# Patient Record
Sex: MALE | NOT HISPANIC OR LATINO
[De-identification: names, ages, dates, MRNs, and addresses within clinical notes are randomized per-mention and may not be internally consistent; named-entity substitution may affect disease eponyms.]

---

## 2017-12-21 PROBLEM — Z00.00 ENCOUNTER FOR PREVENTIVE HEALTH EXAMINATION: Status: ACTIVE | Noted: 2017-12-21

## 2018-01-09 ENCOUNTER — APPOINTMENT (OUTPATIENT)
Dept: SURGERY | Facility: CLINIC | Age: 58
End: 2018-01-09
Payer: COMMERCIAL

## 2018-01-09 VITALS
DIASTOLIC BLOOD PRESSURE: 100 MMHG | BODY MASS INDEX: 42.85 KG/M2 | OXYGEN SATURATION: 98 % | WEIGHT: 273 LBS | HEART RATE: 78 BPM | HEIGHT: 67 IN | SYSTOLIC BLOOD PRESSURE: 135 MMHG

## 2018-01-09 DIAGNOSIS — Z87.442 PERSONAL HISTORY OF URINARY CALCULI: ICD-10-CM

## 2018-01-09 DIAGNOSIS — Z87.39 PERSONAL HISTORY OF OTHER DISEASES OF THE MUSCULOSKELETAL SYSTEM AND CONNECTIVE TISSUE: ICD-10-CM

## 2018-01-09 DIAGNOSIS — Z86.79 PERSONAL HISTORY OF OTHER DISEASES OF THE CIRCULATORY SYSTEM: ICD-10-CM

## 2018-01-09 DIAGNOSIS — F17.200 NICOTINE DEPENDENCE, UNSPECIFIED, UNCOMPLICATED: ICD-10-CM

## 2018-01-09 DIAGNOSIS — Z78.9 OTHER SPECIFIED HEALTH STATUS: ICD-10-CM

## 2018-01-09 PROCEDURE — 99242 OFF/OP CONSLTJ NEW/EST SF 20: CPT

## 2018-01-09 RX ORDER — ASPIRIN ENTERIC COATED TABLETS 81 MG 81 MG/1
81 TABLET, DELAYED RELEASE ORAL
Refills: 0 | Status: ACTIVE | COMMUNITY

## 2018-01-09 RX ORDER — LOSARTAN POTASSIUM AND HYDROCHLOROTHIAZIDE 12.5; 5 MG/1; MG/1
50-12.5 TABLET ORAL
Refills: 0 | Status: ACTIVE | COMMUNITY

## 2018-01-22 ENCOUNTER — OUTPATIENT (OUTPATIENT)
Dept: OUTPATIENT SERVICES | Facility: HOSPITAL | Age: 58
LOS: 1 days | Discharge: HOME | End: 2018-01-22

## 2018-01-22 DIAGNOSIS — Z01.818 ENCOUNTER FOR OTHER PREPROCEDURAL EXAMINATION: ICD-10-CM

## 2018-01-22 DIAGNOSIS — K42.9 UMBILICAL HERNIA WITHOUT OBSTRUCTION OR GANGRENE: ICD-10-CM

## 2018-02-01 ENCOUNTER — APPOINTMENT (OUTPATIENT)
Dept: SURGERY | Facility: CLINIC | Age: 58
End: 2018-02-01
Payer: COMMERCIAL

## 2018-02-01 VITALS — DIASTOLIC BLOOD PRESSURE: 99 MMHG | SYSTOLIC BLOOD PRESSURE: 138 MMHG

## 2018-02-01 DIAGNOSIS — L72.3 SEBACEOUS CYST: ICD-10-CM

## 2018-02-01 PROCEDURE — 99212 OFFICE O/P EST SF 10 MIN: CPT

## 2018-02-14 ENCOUNTER — OUTPATIENT (OUTPATIENT)
Dept: OUTPATIENT SERVICES | Facility: HOSPITAL | Age: 58
LOS: 1 days | Discharge: HOME | End: 2018-02-14

## 2018-02-14 VITALS
HEIGHT: 68 IN | SYSTOLIC BLOOD PRESSURE: 141 MMHG | TEMPERATURE: 98 F | HEART RATE: 75 BPM | WEIGHT: 272.93 LBS | OXYGEN SATURATION: 96 % | DIASTOLIC BLOOD PRESSURE: 92 MMHG | RESPIRATION RATE: 17 BRPM

## 2018-02-14 VITALS — SYSTOLIC BLOOD PRESSURE: 160 MMHG | HEART RATE: 73 BPM | DIASTOLIC BLOOD PRESSURE: 92 MMHG | RESPIRATION RATE: 17 BRPM

## 2018-02-14 DIAGNOSIS — Z98.890 OTHER SPECIFIED POSTPROCEDURAL STATES: Chronic | ICD-10-CM

## 2018-02-14 DIAGNOSIS — N20.0 CALCULUS OF KIDNEY: Chronic | ICD-10-CM

## 2018-02-14 RX ORDER — MORPHINE SULFATE 50 MG/1
4 CAPSULE, EXTENDED RELEASE ORAL ONCE
Qty: 0 | Refills: 0 | Status: DISCONTINUED | OUTPATIENT
Start: 2018-02-14 | End: 2018-02-16

## 2018-02-14 RX ORDER — LOSARTAN POTASSIUM 100 MG/1
1 TABLET, FILM COATED ORAL
Qty: 0 | Refills: 0 | COMMUNITY

## 2018-02-14 RX ORDER — SODIUM CHLORIDE 9 MG/ML
1000 INJECTION, SOLUTION INTRAVENOUS
Qty: 0 | Refills: 0 | Status: DISCONTINUED | OUTPATIENT
Start: 2018-02-14 | End: 2018-03-01

## 2018-02-14 RX ORDER — OXYCODONE AND ACETAMINOPHEN 5; 325 MG/1; MG/1
2 TABLET ORAL ONCE
Qty: 0 | Refills: 0 | Status: DISCONTINUED | OUTPATIENT
Start: 2018-02-14 | End: 2018-02-16

## 2018-02-14 NOTE — ASU DISCHARGE PLAN (ADULT/PEDIATRIC). - NOTIFY
Bleeding that does not stop/Swelling that continues/Numbness, color, or temperature change to extremity/Numbness, tingling/Persistent Nausea and Vomiting/Unable to Urinate/Pain not relieved by Medications/Fever greater than 101

## 2018-02-14 NOTE — BRIEF OPERATIVE NOTE - PROCEDURE
<<-----Click on this checkbox to enter Procedure Umbilical hernia repair with mesh  02/14/2018    Active  [id number]

## 2018-02-16 DIAGNOSIS — K42.9 UMBILICAL HERNIA WITHOUT OBSTRUCTION OR GANGRENE: ICD-10-CM

## 2018-02-22 ENCOUNTER — APPOINTMENT (OUTPATIENT)
Dept: SURGERY | Facility: CLINIC | Age: 58
End: 2018-02-22
Payer: COMMERCIAL

## 2018-02-22 VITALS
DIASTOLIC BLOOD PRESSURE: 80 MMHG | BODY MASS INDEX: 42.85 KG/M2 | SYSTOLIC BLOOD PRESSURE: 130 MMHG | HEIGHT: 67 IN | WEIGHT: 273 LBS

## 2018-02-22 DIAGNOSIS — K42.9 UMBILICAL HERNIA W/OUT OBSTRUCTION OR GANGRENE: ICD-10-CM

## 2018-02-22 PROCEDURE — 99024 POSTOP FOLLOW-UP VISIT: CPT

## 2018-07-26 PROBLEM — Z78.9 ALCOHOL USE: Status: ACTIVE | Noted: 2018-01-09
